# Patient Record
Sex: MALE | Race: OTHER | NOT HISPANIC OR LATINO | URBAN - METROPOLITAN AREA
[De-identification: names, ages, dates, MRNs, and addresses within clinical notes are randomized per-mention and may not be internally consistent; named-entity substitution may affect disease eponyms.]

---

## 2023-08-26 ENCOUNTER — EMERGENCY (EMERGENCY)
Facility: HOSPITAL | Age: 38
LOS: 0 days | Discharge: HOME | End: 2023-08-26
Attending: STUDENT IN AN ORGANIZED HEALTH CARE EDUCATION/TRAINING PROGRAM
Payer: COMMERCIAL

## 2023-08-26 VITALS
DIASTOLIC BLOOD PRESSURE: 102 MMHG | TEMPERATURE: 98 F | HEIGHT: 69 IN | HEART RATE: 99 BPM | WEIGHT: 182.98 LBS | SYSTOLIC BLOOD PRESSURE: 164 MMHG | OXYGEN SATURATION: 98 % | RESPIRATION RATE: 14 BRPM

## 2023-08-26 DIAGNOSIS — K08.89 OTHER SPECIFIED DISORDERS OF TEETH AND SUPPORTING STRUCTURES: ICD-10-CM

## 2023-08-26 PROCEDURE — 99284 EMERGENCY DEPT VISIT MOD MDM: CPT

## 2023-08-26 PROCEDURE — 99284 EMERGENCY DEPT VISIT MOD MDM: CPT | Mod: 25

## 2023-08-26 PROCEDURE — 64400 NJX AA&/STRD TRIGEMINAL NRV: CPT

## 2023-08-26 RX ORDER — IBUPROFEN 200 MG
600 TABLET ORAL ONCE
Refills: 0 | Status: COMPLETED | OUTPATIENT
Start: 2023-08-26 | End: 2023-08-26

## 2023-08-26 RX ORDER — AMOXICILLIN 250 MG/5ML
1 SUSPENSION, RECONSTITUTED, ORAL (ML) ORAL
Qty: 14 | Refills: 0
Start: 2023-08-26 | End: 2023-09-01

## 2023-08-26 RX ADMIN — Medication 600 MILLIGRAM(S): at 17:30

## 2023-08-26 RX ADMIN — Medication 600 MILLIGRAM(S): at 16:46

## 2023-08-26 NOTE — CONSULT NOTE ADULT - SUBJECTIVE AND OBJECTIVE BOX
Patient is a 38y old  Male who presents with a chief complaint of lower left dental pain from tooth #19    HPI: patient states that #19 fractured one month ago but he has not sought dental care because it was not causing pain at that time. pain started 2 days prior.      PAST MEDICAL & SURGICAL HISTORY:    (   ) heart valve replacement  (   ) joint replacement  (   ) pregnancy    MEDICATIONS  (STANDING):    MEDICATIONS  (PRN):      Allergies    No Known Allergies    Intolerances        FAMILY HISTORY:      *SOCIAL HISTORY: (   ) Tobacco; (   ) ETOH    *Last Dental Visit:    Vital Signs Last 24 Hrs  T(C): 36.7 (26 Aug 2023 15:51), Max: 36.7 (26 Aug 2023 15:51)  T(F): 98 (26 Aug 2023 15:51), Max: 98 (26 Aug 2023 15:51)  HR: 99 (26 Aug 2023 15:51) (99 - 99)  BP: 164/102 (26 Aug 2023 15:51) (164/102 - 164/102)  BP(mean): --  RR: 14 (26 Aug 2023 15:51) (14 - 14)  SpO2: 98% (26 Aug 2023 15:51) (98% - 98%)    Parameters below as of 26 Aug 2023 15:51  Patient On (Oxygen Delivery Method): room air        LABS:                  EOE:  TMJ (  - ) clicks                     ( -  ) pops                     (  - ) crepitus             Mandible <<FROM>>             Facial bones and MOM <<grossly intact>>             ( -  ) trismus             ( -  ) lymphadenopathy             (  - ) swelling             ( -  ) asymmetry             ( -  ) palpation             ( -  ) dyspnea             ( -  ) dysphagia             (  - ) loss of consciousness    IOE:  permanent dentition           hard/soft palate:  ( -  ) palatal torus, <<No pathology noted>>           tongue/FOM <<No pathology noted>>           labial/buccal mucosa <<No pathology noted>>           ( +  ) percussion #19           ( +  ) palpation #19           ( -  ) swelling            (  - ) abscess           ( -  ) sinus tract        *DENTAL RADIOGRAPHS: none    RADIOLOGY & ADDITIONAL STUDIES: none    *ASSESSMENT: caries, symptomatic apical periodontitis, no intraoral or extraoral swelling present, no abscess associated with tooth #19.      *PLAN:     PROCEDURE:    id [id number], name: wilner  verbal consent obtained. administered 1 carpule of .5% marcaine w 1:200k epi and 1 carpule of 2% lidocaine w 1:100k epi given via RACHEAL and 0.5 carpule of 4% articaine w 1:100k epi given via local buccal infiltration around area of tooth #19. patient stated after approximately 5 minutes of administering anesthetic that hi left cheek, left side of lip and left side of his jaw were numbed and the dental pain had dissipated. patient advised to schedule appointment with his private dentist for extraction of the tooth or to contact his insurance carrier to search for participating dental providers.    RECOMMENDATIONS:  1) Amoxcillin 500mg for 7 days and OTC pain meds prn.  2) Dental F/U with outpatient dentist for comprehensive dental care.   3) If any difficulty swallowing/breathing, fever occur, return to ER.     Resident Name, pager # [phone]

## 2023-08-26 NOTE — ED PROVIDER NOTE - PHYSICAL EXAMINATION
Constitutional: Well developed, well nourished, no acute distress  Head: Normocephalic, Atraumatic  Eyes: PERRLA, EOMI, conjunctiva and sclera WNL  ENT: Moist mucous membranes, no rhinorrhea, broken left lower molar with surrounding edema, left submandibular edema and tenderness  Neck: Supple, Nontender, normal thyroid, No acute cervical adenopathy  Respiratory: Normal chest excursion with respiration; Breath sounds clear and equal B/L; No wheezes, rales, or rhonchi   Cardiovascular: RRR; Normal S1, S2; No murmurs, rubs or gallops   Neurologic: AAO x 4; Normal motor and sensory function  Psychiatric: Appropriate affect, normal mood

## 2023-08-26 NOTE — ED PROVIDER NOTE - PATIENT PORTAL LINK FT
You can access the FollowMyHealth Patient Portal offered by [facility] by registering at the following website: [email or web address] By joining "[employer]’s FollowMyHealth portal, you will also be able to view your health information using other applications (apps) compatible with our system.

## 2023-08-26 NOTE — ED PROVIDER NOTE - OBJECTIVE STATEMENT
38-year-old male with no pertinent past medical history presenting with left-sided dental pain.  Patient reports 1 month ago he broke his left lower molar.  He began experiencing pain in that molar 2 days ago. Patient presents ED today for worsening pain.  He endorses headache.  Denies fever, chills, nausea, vomiting, lightheadedness, dizziness, chest pain, shortness of breath.

## 2023-08-26 NOTE — ED PROVIDER NOTE - CLINICAL SUMMARY MEDICAL DECISION MAKING FREE TEXT BOX
.    37 y/o no sig pmh p/w dentalgia to tooth #19 intermittently x2d s/p cracking tooth 1mo ago. No fever, jaw swelling, sob, drooling. Exam as noted, No gingival swelling or abscess. + Dental block by dental service.  IMp: dentalgia. Pt stable for dc w/ outpt dental f/up, and care as discussed.  Pt understands plan and signs and symptoms for ED return. DC home.

## 2023-08-26 NOTE — ED PROVIDER NOTE - PROGRESS NOTE DETAILS
Consulted dentistry, inferior alveolar dental block performed. follow up outpatient dentistry recommended